# Patient Record
Sex: MALE | Race: BLACK OR AFRICAN AMERICAN | NOT HISPANIC OR LATINO
[De-identification: names, ages, dates, MRNs, and addresses within clinical notes are randomized per-mention and may not be internally consistent; named-entity substitution may affect disease eponyms.]

---

## 2017-08-30 PROBLEM — Z00.00 ENCOUNTER FOR PREVENTIVE HEALTH EXAMINATION: Status: ACTIVE | Noted: 2017-08-30

## 2017-09-01 ENCOUNTER — APPOINTMENT (OUTPATIENT)
Dept: NEUROLOGY | Facility: CLINIC | Age: 32
End: 2017-09-01

## 2020-09-07 ENCOUNTER — EMERGENCY (EMERGENCY)
Facility: HOSPITAL | Age: 35
LOS: 1 days | Discharge: HOME | End: 2020-09-07
Attending: EMERGENCY MEDICINE
Payer: COMMERCIAL

## 2020-09-07 VITALS
TEMPERATURE: 98 F | SYSTOLIC BLOOD PRESSURE: 155 MMHG | RESPIRATION RATE: 18 BRPM | OXYGEN SATURATION: 100 % | HEIGHT: 71.65 IN | WEIGHT: 235.89 LBS | HEART RATE: 98 BPM | DIASTOLIC BLOOD PRESSURE: 92 MMHG

## 2020-09-07 VITALS
RESPIRATION RATE: 16 BRPM | HEART RATE: 79 BPM | TEMPERATURE: 98 F | OXYGEN SATURATION: 99 % | DIASTOLIC BLOOD PRESSURE: 73 MMHG | SYSTOLIC BLOOD PRESSURE: 140 MMHG

## 2020-09-07 LAB
A1C WITH ESTIMATED AVERAGE GLUCOSE RESULT: 5.9 % — HIGH (ref 4–5.6)
ANION GAP SERPL CALC-SCNC: 4 MMOL/L — LOW (ref 5–17)
BASOPHILS # BLD AUTO: 0.05 K/UL — SIGNIFICANT CHANGE UP (ref 0–0.2)
BASOPHILS NFR BLD AUTO: 0.9 % — SIGNIFICANT CHANGE UP (ref 0–2)
BUN SERPL-MCNC: 12 MG/DL — SIGNIFICANT CHANGE UP (ref 7–18)
CALCIUM SERPL-MCNC: 8.6 MG/DL — SIGNIFICANT CHANGE UP (ref 8.4–10.5)
CHLORIDE SERPL-SCNC: 107 MMOL/L — SIGNIFICANT CHANGE UP (ref 96–108)
CO2 SERPL-SCNC: 28 MMOL/L — SIGNIFICANT CHANGE UP (ref 22–31)
CREAT SERPL-MCNC: 1.07 MG/DL — SIGNIFICANT CHANGE UP (ref 0.5–1.3)
EOSINOPHIL # BLD AUTO: 0.1 K/UL — SIGNIFICANT CHANGE UP (ref 0–0.5)
EOSINOPHIL NFR BLD AUTO: 1.9 % — SIGNIFICANT CHANGE UP (ref 0–6)
ESTIMATED AVERAGE GLUCOSE: 123 MG/DL — HIGH (ref 68–114)
FOLATE SERPL-MCNC: 12.8 NG/ML — SIGNIFICANT CHANGE UP
GLUCOSE SERPL-MCNC: 97 MG/DL — SIGNIFICANT CHANGE UP (ref 70–99)
HCT VFR BLD CALC: 46.2 % — SIGNIFICANT CHANGE UP (ref 39–50)
HGB BLD-MCNC: 15.3 G/DL — SIGNIFICANT CHANGE UP (ref 13–17)
HIV 1 & 2 AB SERPL IA.RAPID: SIGNIFICANT CHANGE UP
IMM GRANULOCYTES NFR BLD AUTO: 0.2 % — SIGNIFICANT CHANGE UP (ref 0–1.5)
LYMPHOCYTES # BLD AUTO: 2.19 K/UL — SIGNIFICANT CHANGE UP (ref 1–3.3)
LYMPHOCYTES # BLD AUTO: 41.1 % — SIGNIFICANT CHANGE UP (ref 13–44)
MAGNESIUM SERPL-MCNC: 2 MG/DL — SIGNIFICANT CHANGE UP (ref 1.6–2.6)
MCHC RBC-ENTMCNC: 28.2 PG — SIGNIFICANT CHANGE UP (ref 27–34)
MCHC RBC-ENTMCNC: 33.1 GM/DL — SIGNIFICANT CHANGE UP (ref 32–36)
MCV RBC AUTO: 85.1 FL — SIGNIFICANT CHANGE UP (ref 80–100)
MONOCYTES # BLD AUTO: 0.29 K/UL — SIGNIFICANT CHANGE UP (ref 0–0.9)
MONOCYTES NFR BLD AUTO: 5.4 % — SIGNIFICANT CHANGE UP (ref 2–14)
NEUTROPHILS # BLD AUTO: 2.69 K/UL — SIGNIFICANT CHANGE UP (ref 1.8–7.4)
NEUTROPHILS NFR BLD AUTO: 50.5 % — SIGNIFICANT CHANGE UP (ref 43–77)
NRBC # BLD: 0 /100 WBCS — SIGNIFICANT CHANGE UP (ref 0–0)
PHOSPHATE SERPL-MCNC: 3.3 MG/DL — SIGNIFICANT CHANGE UP (ref 2.5–4.5)
PLATELET # BLD AUTO: 103 K/UL — LOW (ref 150–400)
POTASSIUM SERPL-MCNC: 4.9 MMOL/L — SIGNIFICANT CHANGE UP (ref 3.5–5.3)
POTASSIUM SERPL-SCNC: 4.9 MMOL/L — SIGNIFICANT CHANGE UP (ref 3.5–5.3)
RBC # BLD: 5.43 M/UL — SIGNIFICANT CHANGE UP (ref 4.2–5.8)
RBC # FLD: 13.1 % — SIGNIFICANT CHANGE UP (ref 10.3–14.5)
SARS-COV-2 RNA SPEC QL NAA+PROBE: SIGNIFICANT CHANGE UP
SODIUM SERPL-SCNC: 139 MMOL/L — SIGNIFICANT CHANGE UP (ref 135–145)
VIT B12 SERPL-MCNC: 477 PG/ML — SIGNIFICANT CHANGE UP (ref 232–1245)
WBC # BLD: 5.33 K/UL — SIGNIFICANT CHANGE UP (ref 3.8–10.5)
WBC # FLD AUTO: 5.33 K/UL — SIGNIFICANT CHANGE UP (ref 3.8–10.5)

## 2020-09-07 PROCEDURE — 82962 GLUCOSE BLOOD TEST: CPT

## 2020-09-07 PROCEDURE — 99284 EMERGENCY DEPT VISIT MOD MDM: CPT | Mod: 25

## 2020-09-07 PROCEDURE — 83735 ASSAY OF MAGNESIUM: CPT

## 2020-09-07 PROCEDURE — 99284 EMERGENCY DEPT VISIT MOD MDM: CPT

## 2020-09-07 PROCEDURE — 82607 VITAMIN B-12: CPT

## 2020-09-07 PROCEDURE — 70450 CT HEAD/BRAIN W/O DYE: CPT | Mod: 26

## 2020-09-07 PROCEDURE — 80048 BASIC METABOLIC PNL TOTAL CA: CPT

## 2020-09-07 PROCEDURE — 84443 ASSAY THYROID STIM HORMONE: CPT

## 2020-09-07 PROCEDURE — 85027 COMPLETE CBC AUTOMATED: CPT

## 2020-09-07 PROCEDURE — 80076 HEPATIC FUNCTION PANEL: CPT

## 2020-09-07 PROCEDURE — 86703 HIV-1/HIV-2 1 RESULT ANTBDY: CPT

## 2020-09-07 PROCEDURE — 83036 HEMOGLOBIN GLYCOSYLATED A1C: CPT

## 2020-09-07 PROCEDURE — 84100 ASSAY OF PHOSPHORUS: CPT

## 2020-09-07 PROCEDURE — 87635 SARS-COV-2 COVID-19 AMP PRB: CPT

## 2020-09-07 PROCEDURE — 36415 COLL VENOUS BLD VENIPUNCTURE: CPT

## 2020-09-07 PROCEDURE — 70450 CT HEAD/BRAIN W/O DYE: CPT

## 2020-09-07 PROCEDURE — 82746 ASSAY OF FOLIC ACID SERUM: CPT

## 2020-09-07 PROCEDURE — 93005 ELECTROCARDIOGRAM TRACING: CPT

## 2020-09-07 RX ORDER — ONDANSETRON 8 MG/1
1 TABLET, FILM COATED ORAL
Qty: 12 | Refills: 0
Start: 2020-09-07

## 2020-09-07 RX ORDER — SODIUM CHLORIDE 9 MG/ML
3 INJECTION INTRAMUSCULAR; INTRAVENOUS; SUBCUTANEOUS ONCE
Refills: 0 | Status: DISCONTINUED | OUTPATIENT
Start: 2020-09-07 | End: 2020-09-11

## 2020-09-07 RX ORDER — FAMOTIDINE 10 MG/ML
1 INJECTION INTRAVENOUS
Qty: 20 | Refills: 0
Start: 2020-09-07

## 2020-09-07 NOTE — ED PROVIDER NOTE - OBJECTIVE STATEMENT
Chief complaint of numbness to tongue, right side of face and right hand. Denies LOC, no motor weakness, no vision changes, no fever, no shortness of breath, no chest pain.  No neck pain, denies head or neck trauma.

## 2020-09-07 NOTE — ED ADULT TRIAGE NOTE - CHIEF COMPLAINT QUOTE
c/o feeling numbness/tingling sensation  in his right hand and face since FRIDAY/DENIES SLURRED SPEECH/WEAKNESS

## 2020-09-07 NOTE — ED ADULT NURSE NOTE - OBJECTIVE STATEMENT
The patient presents with complaint of tongue numbness three days ago that progressed to his right side of the face and concurrently to his right hand.  He describes it as the feeling one gets when circulation to a leg is diminished and falls asleep.  The patient is aaox4, with sensory discrimination.  Full ROM.  Denies chest pain.  He has history of [place] Palsy.

## 2020-09-07 NOTE — ED PROVIDER NOTE - CLINICAL SUMMARY MEDICAL DECISION MAKING FREE TEXT BOX
CT head wnl, labs wnl. Pt has no focal deficits, strength and sensation is intact. Case was dw Dr. Roa who agrees with discharge and f/u with pt is 95-25  [facility] neuro clinic. Pt is well appearing, has no new complaints and able to walk with normal gait. Pt is stable for discharge and follow up with medical doctor. Pt educated on care and need for follow up. Discussed anticipatory guidance and return precautions. Questions answered. I had a detailed discussion with the patient and/or guardian regarding the historical points, exam findings, and any diagnostic results supporting the discharge diagnosis.

## 2020-09-07 NOTE — ED PROVIDER NOTE - NSFOLLOWUPINSTRUCTIONS_ED_ALL_ED_FT
Paresthesia  Paresthesia is an abnormal burning or prickling sensation. It is usually felt in the hands, arms, legs, or feet. However, it may occur in any part of the body. Usually, paresthesia is not painful. It may feel like:  Tingling or numbness.Buzzing.Itching.Paresthesia may occur without any clear cause, or it may be caused by:  Breathing too quickly (hyperventilation).Pressure on a nerve.An underlying medical condition.Side effects of a medication.Nutritional deficiencies.Exposure to toxic chemicals.Most people experience temporary (transient) paresthesia at some time in their lives. For some people, it may be long-lasting (chronic) because of an underlying medical condition. If you have paresthesia that lasts a long time, you may need to be evaluated by your health care provider.  Follow these instructions at home:  Alcohol use        Do not drink alcohol if:  Your health care provider tells you not to drink.You are pregnant, may be pregnant, or are planning to become pregnant.If you drink alcohol:  Limit how much you use to:  0–1 drink a day for women.0–2 drinks a day for men.Be aware of how much alcohol is in your drink. In the [place] one drink equals one 12 oz bottle of beer (355 mL), one 5 oz glass of wine (148 mL), or one 1½ oz glass of hard liquor (44 mL).Nutrition        Eat a healthy diet. This includes:  Eating foods that are high in fiber, such as fresh fruits and vegetables, whole grains, and beans.Limiting foods that are high in fat and processed sugars, such as fried or sweet foods.General instructions     Take over-the-counter and prescription medicines only as told by your health care provider.Do not use any products that contain nicotine or tobacco, such as cigarettes and e-cigarettes. These can keep blood from reaching damaged nerves. If you need help quitting, ask your health care provider.If you have diabetes, work closely with your health care provider to keep your blood sugar under control.If you have numbness in your feet:  Check every day for signs of injury or infection. Watch for redness, warmth, and swelling.Wear padded socks and comfortable shoes. These help protect your feet.Keep all follow-up visits as told by your health care provider. This is important.Contact a health care provider if you:  Have paresthesia that gets worse or does not go away.Have a burning or prickling feeling that gets worse when you walk.Have pain, cramps, or dizziness.Develop a rash.Get help right away if you:  Feel weak.Have trouble walking or moving.Have problems with speech, understanding, or vision.Feel confused.Cannot control your bladder or bowel movements.Have numbness after an injury.Develop new weakness in an arm or leg.Faint.Summary  Paresthesia is an abnormal burning or prickling sensation that is usually felt in the hands, arms, legs, or feet. It may also occur in other parts of the body.Paresthesia may occur without any clear cause, or it may be caused by breathing too quickly (hyperventilation), pressure on a nerve, an underlying medical condition, side effects of a medication, nutritional deficiencies, or exposure to toxic chemicals.If you have paresthesia that lasts a long time, you may need to be evaluated by your health care provider.This information is not intended to replace advice given to you by your health care provider. Make sure you discuss any questions you have with your health care provider.

## 2020-09-07 NOTE — ED ADULT NURSE NOTE - CHPI ED NUR SYMPTOMS NEG
no weakness/no dizziness/no loss of consciousness/no nausea/no vomiting/no fever/no blurred vision/no confusion/no change in level of consciousness

## 2020-09-07 NOTE — ED PROVIDER NOTE - NSFOLLOWUPCLINICS_GEN_ALL_ED_FT
[facility]  Neurology  [address] [place] [address]  Phone: [phone]  Fax: [phone]  Follow Up Time:

## 2020-09-07 NOTE — ED PROVIDER NOTE - PATIENT PORTAL LINK FT
You can access the FollowMyHealth Patient Portal offered by [facility] by registering at the following website: [email or web address]. By joining [employer]’s FollowMyHealth portal, you will also be able to view your health information using other applications (apps) compatible with our system.

## 2024-07-25 NOTE — ED PROVIDER NOTE - PHYSICAL EXAMINATION
Where Is Your Skin Lesion Of Concern Located?: Right middle finger Where Is Your Skin Lesion Of Concern Located?: Mid forehead Kernig and Brudzinski signs area negative, no petechiae, no photophobia, no dysarthria, no facial asymmetry, no focal deficits.   NIH stroke scale is zero. Pt passed dysphagia screen.   No cervical, thoracic or lumbosacral midline bony deformities,  +rotation and flexion-extension of neck and truncal area intact.   Sensation discrimination is intact to facial area and hands.